# Patient Record
Sex: FEMALE | Race: BLACK OR AFRICAN AMERICAN
[De-identification: names, ages, dates, MRNs, and addresses within clinical notes are randomized per-mention and may not be internally consistent; named-entity substitution may affect disease eponyms.]

---

## 2019-01-08 NOTE — HP
CIWA Score





- Admission Criteria


OASAS Guidelines: Admission for Medically Managed Detox: 


Requires at least one of the followin. CIWA greater than 12


2. Seizures within the past 24 hours


3. Delirium tremens within the past 24 hours


4. Hallucinations within the past 24 hours


5. Acute intervention needed for co  occurring medical disorder


6. Acute intervention needed for co  occurring psychiatric disorder


7. Severe withdrawal that cannot be handled at a lower level of care (continued


    vomiting, continued diarrhea, abnormal vital signs) requiring intravenous


    medication and/or fluids


8. Pregnancy








Admission ROS [facility]





- [facility]


Chief Complaint: 





HERE SEEKING DETOX . DENIES WITHDRAWAL SXS'. LAST DRINK 1 DAY AGO. DENIES HX/O 

SEIZURES. OFFERED REHAB SERVICES. CLIENT ACCEPTS TO MOVE FORWARD WITH ADMISSION


Allergies/Adverse Reactions: 


 Allergies











Allergy/AdvReac Type Severity Reaction Status Date / Time


 


No Known Drug Allergies Allergy   Verified 19 19:08











History of Present Illness: 





28 Y.O FEMALE WITH HX/O ALCOHOLISM, CRACK, CANNABIS DEPENDENCE ADMITTED TO 

REHAB.HERE INITIALLY SEEKING DETOX . REPORTS SHE DRINKS 1 16 OZ CAN OF BEER AND 

5 OZ LIQUOR DAILY. DENIES WITHDRAWAL SXS'/ SEIZURES. REPORTS LAST DRINK 1 DAY 

AGO. OFFERED REHAB SERVICES. CLIENT ACCEPTS TO MOVE FORWARD WITH ADMISSION. 

THIS IS HERE FIRST ADMISSION HERE. STATES THIS IS HER FIRST INPATIENT TXMENT. 

REFERRED BY [facility] AFTER PRESENTING THERE FOR HELP TO QUIT HER 

SUBSTANCE ABUSE. DENIES ANY SIGNIFICANT CLEAN TIME. DENIES HX/O AND PRESENT SI/

HI, AVH. REPORTS SHE IS CURRENTLY HOMELESS, UNEMPLOYED-SSI, DENIES LEGALS.





PMHX- ASTHMA


PSYCH- BIPOLAR, SCHIZOPHRENIA, PSYCHOSIS


MEDS- RISPERDAL, SEROQUEL LAST TAKEN 2 WEEKS AGO.


Exam Limitations: No Limitations





- Ebola screening


Have you traveled outside of the country in the last 21 days: No


Have you had contact with anyone from an Ebola affected area: No


Have you been sick,other than usual withdrawal symptoms: No


Do you have a fever: No





- Review of Systems


Constitutional: No Symptoms Reported


EENT: reports: Dental Problems (MISSING TEETH)


Respiratory: reports: Shortness of Breath (INTERMITTENT WITH COUGH), Productive 

cough


Cardiac: reports: No Symptoms Reported


GI: reports: No Symptoms Reported


: reports: No Symptoms Reported


Musculoskeletal: reports: No Symptoms Reported


Integumentary: reports: No Symptoms Reported


Neuro: reports: No Symptoms reported


Endocrine: reports: No Symptoms Reported


Hematology: reports: No Symptoms Reported


Psychiatric: reports: Orientated x3


Other Systems: Reviewed and Negative





Patient History





- Patient Medical History


Hx Anemia: No


Hx Asthma: Yes (Pt is Albuterol IH prn)


Hx Chronic Obstructive Pulmonary Disease (COPD): No


Hx Cancer: No


Hx Cardiac Disorders: No


Hx Congestive Heart Failure: No


Hx Hypertension: No


Hx Hypercholesterolemia: No


Hx Pacemaker: No


HX Cerebrovascular Accident: No


Hx Seizures: No


Hx Dementia: No


Hx Diabetes: No


Hx Gastrointestinal Disorders: No


Hx Liver Disease: No


Hx Genitourinary Disorders: No


Hx Sexually Transmitted Disorders: No


Hx Renal Disease (ESRD): No


Hx Thyroid Disease: No


Hx Human Immunodeficiency Virus (HIV): No


Hx Hepatitis C: No


Hx Depression: No


Hx Suicide Attempt: No


Hx Bipolar Disorder: Yes


Hx Schizophrenia: Yes


Other Medical History: PSYCHOSIS





- Patient Surgical History


Past Surgical History: No


Hx Neurologic Surgery: No


Hx Cataract Extraction: No


Hx Cardiac Surgery: No


Hx Lung Surgery: No


Hx Breast Surgery: No


Hx Breast Biopsy: No


Hx Abdominal Surgery: No


Hx Appendectomy: No


Hx Cholecystectomy: No


Hx Genitourinary Surgery: No


Hx  Section: No


Hx Orthopedic Surgery: No


Anesthesia Reaction: No





- PPD History


Previous Implant?: Yes


Documented Results: Negative w/o proof


Implanted On Prior [facility] Admission?: No


PPD to be Administered?: Yes





- Reproductive History


Patient is a Female of Child Bearing Age (11 -55 yrs old): Yes


Last Menstrual Period: 19


LMP comment: REG


Patient Pregnant: No (NEG [facility]CG)





- Smoking Cessation


Smoking history: Current every day smoker


Have you smoked in the past 12 months: Yes


Aproximately how many cigarettes per day: 5


Cigars Per Day: 0


Hx Chewing Tobacco Use: No


Initiated information on smoking cessation: Yes


'Breaking Loose' booklet given: 19





- Substance & Tx. History


Hx Alcohol Use: Yes


Hx Substance Use: Yes


Substance Use Type: Alcohol, Cocaine, Marijuana


Hx Substance Use Treatment: No





- Substances Abused


  ** Alcohol


Route: Oral


Frequency: Daily


Amount used: 1-16 OZ BEER/ 5OZ-/LIQUOR


Age of first use: 17


Date of Last Use: 19





  ** Crack


Route: Inhalation


Frequency: Daily


Amount used: $100


Age of first use: 19


Date of Last Use: 19





  ** Marijuana/Hashish


Route: Smoking


Frequency: Daily


Amount used: 2 bags


Age of first use: 17


Date of Last Use: 19





Family Disease History





- Family Disease History


Family Disease History: Other: Grandparent (GRANDMOTHER BIPOLAR)





Admission Physical Exam [facility]





- Vital Signs


Vital Signs: 


 Vital Signs - 24 hr











  19





  18:46


 


Temperature 96.7 F L


 


Pulse Rate 84


 


Respiratory 18





Rate 


 


Blood Pressure 127/80














- Physical


General Appearance: Yes: No Apparent Distress, Disheveled, Other (UNKEPT)


HEENTM: Yes: EOMI, Normocephalic, Normal Voice, BRIANNE, Pharynx Normal


Respiratory: Yes: Chest Non-Tender, Lungs Clear, Normal Breath Sounds, No 

Respiratory Distress, No Accessory Muscle Use


Neck: Yes: No masses,lesions,Nodules, Supple


Breast: Yes: Breast Exam Deferred


Cardiology: Yes: Regular Rhythm, Regular Rate, S1, S2


Abdominal: Yes: Normal Bowel Sounds, Non Tender, Soft, Protuberent


Genitourinary: Yes: Other (NO C/O OFFERED)


Back: Yes: Normal Inspection


Musculoskeletal: Yes: full range of Motion, Gait Steady


Extremities: Yes: Normal Capillary Refill, Normal Inspection, Normal Range of 

Motion, Non-Tender


Neurological: Yes: Fully Oriented, Alert, Normal Mood/Affect


Integumentary: Yes: Dry, Warm


Lymphatic: Yes: Within Normal Limits





- Diagnostic


(1) Uncomplicated alcohol dependence


Current Visit: Yes   Status: Chronic   





(2) Cannabis dependence, uncomplicated


Current Visit: Yes   Status: Chronic   





(3) Cocaine abuse, uncomplicated


Current Visit: Yes   Status: Chronic   





(4) Nicotine dependence


Current Visit: Yes   Status: Chronic   


Qualifiers: 


   Nicotine product type: cigarettes   Substance use status: uncomplicated   

Qualified Code(s): F17.210 - Nicotine dependence, cigarettes, uncomplicated   





(5) Asthma


Current Visit: Yes   Status: Chronic   


Qualifiers: 


   Asthma severity: mild   Asthma persistence: intermittent   Asthma 

complication type: uncomplicated   Qualified Code(s): J45.20 - Mild 

intermittent asthma, uncomplicated   





(6) Homeless


Current Visit: Yes   Status: Chronic   





(7) Substance induced mood disorder


Current Visit: Yes   Status: Suspected   Comment: REPORTS HX/O BIPOLAR, 

SCHIZOPHRENIA, PSYCHOSIS   





(8) Non compliance w medication regimen


Current Visit: Yes   Status: Chronic   





(9) Obesity (BMI 30.0-34.9)


Current Visit: Yes   Status: Chronic   





Cleared for Admission [facility]





- Detox or Rehab


Detox Regimen/Protocol: Not Applicable


Claeared for Rehab Admission: Yes





[facility] Breath Alcohol Content


Breath Alcohol Content: 0





Urine Pregancy Test





- Result


Urine Pregnancy Test Results: Negative- NO Line Present





Urine Drug Screen





- Results


Drug Screen Negative: No


Urine Drug Screen Results: THC-Marijuana, DINAH-Cocaine





Inpatient Rehab Admission





- Initial Determination


Are CD services needed?: Yes


Free of communicable disease: Yes


Not in need of hospitalization: Yes





- Rehab Admission Criteria


Previous failed treatment: Yes


Poor recovery environment: Yes


Comorbidities: Yes


Lacks judgement: No


Patient is meeting Inpatient Rehab admission criteria:: Yes

## 2019-01-09 NOTE — EKG
Test Reason : 

Blood Pressure : ***/*** mmHG

Vent. Rate : 086 BPM     Atrial Rate : 086 BPM

   P-R Int : 146 ms          QRS Dur : 082 ms

    QT Int : 388 ms       P-R-T Axes : 059 051 030 degrees

   QTc Int : 464 ms

 

NORMAL SINUS RHYTHM

NORMAL ECG

NO PREVIOUS ECGS AVAILABLE

Confirmed by CHRIS HESTER, ENRIQUE ([id number]) on 1/9/2019 12:55:56 PM

 

Referred By:             Confirmed By:ENRIQUE PEREZ MD
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
## 2019-03-13 ENCOUNTER — HOSPITAL ENCOUNTER (INPATIENT)
Dept: HOSPITAL 74 - YASAS | Age: 29
LOS: 6 days | Discharge: LEFT BEFORE DISCHARGE | DRG: 770 | End: 2019-03-19
Attending: NEUROMUSCULOSKELETAL MEDICINE & OMM | Admitting: NEUROMUSCULOSKELETAL MEDICINE & OMM
Payer: COMMERCIAL

## 2019-03-13 VITALS — BODY MASS INDEX: 33.3 KG/M2

## 2019-03-13 DIAGNOSIS — F12.20: ICD-10-CM

## 2019-03-13 DIAGNOSIS — J45.20: ICD-10-CM

## 2019-03-13 DIAGNOSIS — F17.210: ICD-10-CM

## 2019-03-13 DIAGNOSIS — E66.9: ICD-10-CM

## 2019-03-13 DIAGNOSIS — F13.20: ICD-10-CM

## 2019-03-13 DIAGNOSIS — F14.20: ICD-10-CM

## 2019-03-13 DIAGNOSIS — R03.0: ICD-10-CM

## 2019-03-13 DIAGNOSIS — F10.20: Primary | ICD-10-CM

## 2019-03-13 RX ADMIN — Medication SCH: at 21:47
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
## 2019-03-14 LAB
ALBUMIN SERPL-MCNC: 4.1 G/DL (ref 3.4–5)
ALP SERPL-CCNC: 66 U/L (ref 45–117)
ALT SERPL-CCNC: 15 U/L (ref 13–61)
ANION GAP SERPL CALC-SCNC: 7 MMOL/L (ref 8–16)
AST SERPL-CCNC: 13 U/L (ref 15–37)
BILIRUB SERPL-MCNC: 0.6 MG/DL (ref 0.2–1)
BUN SERPL-MCNC: 8 MG/DL (ref 7–18)
CALCIUM SERPL-MCNC: 9.2 MG/DL (ref 8.5–10.1)
CHLORIDE SERPL-SCNC: 106 MMOL/L (ref 98–107)
CO2 SERPL-SCNC: 24 MMOL/L (ref 21–32)
CREAT SERPL-MCNC: 0.8 MG/DL (ref 0.55–1.3)
DEPRECATED RDW RBC AUTO: 14.6 % (ref 11.6–15.6)
GLUCOSE SERPL-MCNC: 73 MG/DL (ref 74–106)
HCT VFR BLD CALC: 35.5 % (ref 32.4–45.2)
HGB BLD-MCNC: 12.2 GM/DL (ref 10.7–15.3)
MCH RBC QN AUTO: 31.7 PG (ref 25.7–33.7)
MCHC RBC AUTO-ENTMCNC: 34.3 G/DL (ref 32–36)
MCV RBC: 92.4 FL (ref 80–96)
PLATELET # BLD AUTO: 311 K/MM3 (ref 134–434)
PMV BLD: 8.9 FL (ref 7.5–11.1)
POTASSIUM SERPLBLD-SCNC: 4 MMOL/L (ref 3.5–5.1)
PROT SERPL-MCNC: 7.8 G/DL (ref 6.4–8.2)
RBC # BLD AUTO: 3.84 M/MM3 (ref 3.6–5.2)
SODIUM SERPL-SCNC: 137 MMOL/L (ref 136–145)
WBC # BLD AUTO: 5.6 K/MM3 (ref 4–10)

## 2019-03-14 RX ADMIN — HYDROXYZINE PAMOATE PRN MG: 25 CAPSULE ORAL at 21:46

## 2019-03-14 RX ADMIN — Medication SCH MG: at 21:46

## 2019-03-14 RX ADMIN — Medication SCH TAB: at 10:38

## 2019-03-14 RX ADMIN — NICOTINE SCH: 14 PATCH, EXTENDED RELEASE TRANSDERMAL at 10:38

## 2019-03-15 RX ADMIN — NICOTINE SCH: 14 PATCH, EXTENDED RELEASE TRANSDERMAL at 11:15

## 2019-03-15 RX ADMIN — Medication SCH TAB: at 11:15

## 2019-03-15 RX ADMIN — Medication SCH MG: at 21:26

## 2019-03-16 PROCEDURE — HZ42ZZZ GROUP COUNSELING FOR SUBSTANCE ABUSE TREATMENT, COGNITIVE-BEHAVIORAL: ICD-10-PCS | Performed by: NEUROMUSCULOSKELETAL MEDICINE & OMM

## 2019-03-16 RX ADMIN — Medication SCH MG: at 21:41

## 2019-03-16 RX ADMIN — NICOTINE SCH: 14 PATCH, EXTENDED RELEASE TRANSDERMAL at 10:19

## 2019-03-16 RX ADMIN — HYDROXYZINE PAMOATE PRN MG: 25 CAPSULE ORAL at 21:41

## 2019-03-16 RX ADMIN — Medication SCH TAB: at 10:19

## 2019-03-17 RX ADMIN — Medication SCH: at 21:47

## 2019-03-17 RX ADMIN — NICOTINE SCH: 14 PATCH, EXTENDED RELEASE TRANSDERMAL at 10:22

## 2019-03-17 RX ADMIN — Medication SCH: at 10:22

## 2019-03-18 VITALS — SYSTOLIC BLOOD PRESSURE: 125 MMHG | DIASTOLIC BLOOD PRESSURE: 83 MMHG | HEART RATE: 67 BPM | TEMPERATURE: 98.4 F

## 2019-03-18 RX ADMIN — Medication SCH: at 10:19

## 2019-03-18 RX ADMIN — HYDROXYZINE PAMOATE PRN MG: 25 CAPSULE ORAL at 19:55

## 2019-03-18 RX ADMIN — NICOTINE SCH: 14 PATCH, EXTENDED RELEASE TRANSDERMAL at 10:19

## 2019-03-19 RX ADMIN — Medication SCH: at 00:27

## 2019-03-19 NOTE — PN
[facility] Progress Note


Note: 





NURSING STAFF ON UNIT INFORMED WRITER THAT PT WANTS TO LEAVE. WRITER SPOKE WITH 

PT'S COUNSELOR, LILA EUCEDA WHO STATED PT REPORTED TO HER SHE IS GOING 

BACK TO WORK BUT  WAS CURRENTLY TAKING A SHOWER SO COULD NOT BE SEEN AT FIRST 

VISIT TO THE UNIT. WHEN WRITER RETURNED  BACK TO THE UNIT TO SPEAK TO PT, NURSE 

LEAH REPORTED THAT PT HAD LEFT. PROVIDER  WAS UNABLE TO SEE THIS PATIENT 1:1.


 Home Medications











 Medication  Instructions  Recorded


 


NK [No Known Home Medication]  03/13/19











 


 Vital Signs (72 hours)











  03/17/19 03/17/19 03/17/19





  00:30 03:30 07:13


 


Temperature   


 


Pulse Rate   


 


Respiratory 16 16 16





Rate   


 


Blood Pressure   














  03/18/19 03/18/19 03/19/19





  03:30 06:59 00:30


 


Temperature  98.4 F 


 


Pulse Rate  67 


 


Respiratory 18 16 18





Rate   


 


Blood Pressure  125/83 














  03/19/19





  03:30


 


Temperature 


 


Pulse Rate 


 


Respiratory 18





Rate 


 


Blood Pressure 








 AS PER COUNSELOR ABOVE, PT IS REFERRED [facility] FOR MEDICAL MANAGEMENT 

WHEN NEEDED.
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
## 2019-03-19 NOTE — PN
[facility] Progress Note


Note: 





Patient not found on the unit. Told by nurse that patient left against medical 

advice

## 2019-06-24 ENCOUNTER — HOSPITAL ENCOUNTER (INPATIENT)
Dept: HOSPITAL 74 - YASAS | Age: 29
LOS: 2 days | Discharge: LEFT BEFORE DISCHARGE | DRG: 770 | End: 2019-06-26
Attending: NEUROMUSCULOSKELETAL MEDICINE & OMM | Admitting: NEUROMUSCULOSKELETAL MEDICINE & OMM
Payer: COMMERCIAL

## 2019-06-24 DIAGNOSIS — F17.210: ICD-10-CM

## 2019-06-24 DIAGNOSIS — Z87.42: ICD-10-CM

## 2019-06-24 DIAGNOSIS — Z91.5: ICD-10-CM

## 2019-06-24 DIAGNOSIS — F12.20: ICD-10-CM

## 2019-06-24 DIAGNOSIS — J45.909: ICD-10-CM

## 2019-06-24 DIAGNOSIS — F14.20: Primary | ICD-10-CM

## 2019-06-24 DIAGNOSIS — F25.9: ICD-10-CM

## 2019-06-24 PROCEDURE — HZ42ZZZ GROUP COUNSELING FOR SUBSTANCE ABUSE TREATMENT, COGNITIVE-BEHAVIORAL: ICD-10-PCS | Performed by: NEUROMUSCULOSKELETAL MEDICINE & OMM

## 2019-06-24 RX ADMIN — Medication SCH: at 21:59

## 2019-06-24 NOTE — HP
CIWA Score





- Admission Criteria


[facility] Guidelines: Admission for Medically Managed Detox: 


Requires at least one of the followin. CIWA greater than 12


2. Seizures within the past 24 hours


3. Delirium tremens within the past 24 hours


4. Hallucinations within the past 24 hours


5. Acute intervention needed for co  occurring medical disorder


6. Acute intervention needed for co  occurring psychiatric disorder


7. Severe withdrawal that cannot be handled at a lower level of care (continued


    vomiting, continued diarrhea, abnormal vital signs) requiring intravenous


    medication and/or fluids


8. Pregnancy








Admission ROS S





- HPI


Chief Complaint: 





here for rehab from cocaine, K2, heroin





29 yo with hx of crack cocaine, k2, marijuana, dependence is here seeking 

rehabilitation. Pt was last here on 3/2019 and stayed for a week. Pt states she 

was going through craving for crack cocaine and so left but says she is very 

committed to complete rehab this time. Says she is tired of using.





Denies hx of seizures or blackouts. Denies suicidal / homicidal ideation. 

Reports hx of suicide attempt five years ago by lacerating wrist. 


PMHX; Asthma, bipolar, schizophrenia and psychosis. 





K2- 2 sticks


cocaine crack: 20 caps/day


marijuana- 2





DUR- no controlled substances


Utox- estrada

















Allergies/Adverse Reactions: 


 Allergies











Allergy/AdvReac Type Severity Reaction Status Date / Time


 


No Known Drug Allergies Allergy   Verified 19 16:23














- Ebola screening


Have you traveled outside of the country in the last 21 days: No


Have you had contact with anyone from an Ebola affected area: No





Patient History





- Patient Medical History


Hx Anemia: No


Hx Asthma: Yes


Hx Chronic Obstructive Pulmonary Disease (COPD): No


Hx Cancer: No


Hx Cardiac Disorders: No


Hx Congestive Heart Failure: No


Hx Hypertension: No


Hx Hypercholesterolemia: No


Hx Pacemaker: No


HX Cerebrovascular Accident: No


Hx Seizures: No


Hx Dementia: No


Hx Diabetes: No


Hx Gastrointestinal Disorders: No


Hx Liver Disease: No


Hx Genitourinary Disorders: No


Hx Sexually Transmitted Disorders: Yes (chlamydia )


Hx Renal Disease (ESRD): No


Hx Thyroid Disease: No


Hx Human Immunodeficiency Virus (HIV): No


Hx Hepatitis C: No


Hx Depression: No


Hx Suicide Attempt: Yes (five years ago)


Hx Bipolar Disorder: Yes


Hx Schizophrenia: Yes





- Patient Surgical History


Past Surgical History: No


Hx Neurologic Surgery: No


Hx Cataract Extraction: No


Hx Cardiac Surgery: No


Hx Lung Surgery: No


Hx Breast Surgery: No


Hx Breast Biopsy: No


Hx Abdominal Surgery: No


Hx Appendectomy: No


Hx Cholecystectomy: No


Hx Genitourinary Surgery: No


Hx  Section: No


Hx Orthopedic Surgery: No


Anesthesia Reaction: No





- PPD History


Date: 01/10/19





- Reproductive History


Last Menstrual Period: 19


Patient Pregnant: No





- Smoking Cessation


Smoking history: Current every day smoker


Have you smoked in the past 12 months: Yes


Aproximately how many cigarettes per day: 5


Cigars Per Day: 0


Hx Chewing Tobacco Use: No


Initiated information on smoking cessation: Yes


'Breaking Loose' booklet given: 19





- Substances abused


  ** Crack


Substance route: Inhalation


Frequency: Daily


Amount used: 20 capsules/day


Age of first use: 


Date of last use: 18





  ** Cocaine


Substance route: Inhalation


Frequency: Daily


Amount used: 20 capsules/day


Age of first use: 26


Date of last use: 18





  ** Heroin


Substance route: Inhalation


Frequency: Daily


Amount used: 2 packs/day


Age of first use: 


Date of last use: 18





Family Disease History





- Family Disease History


Family Disease History: Other: Grandparent (GRANDMOTHER BIPOLAR)





Admission Physical Exam [facility]





- Vital Signs


Vital Signs: 


 Vital Signs - 24 hr











  19





  12:38


 


Temperature 97.5 F L


 


Pulse Rate 78


 


Respiratory 20





Rate 


 


Blood Pressure 141/99














- Physical


General Appearance: Yes: Within Normal Limits


HEENTM: Yes: Within Normal Limits, EOMI, Hearing grossly Normal, Normal Voice, 

BRIANNE


Respiratory: Yes: Within Normal Limits, Lungs Clear


Neck: Yes: Within Normal Limits


Cardiology: Yes: Within Normal Limits, Regular Rhythm, Regular Rate


Abdominal: Yes: Within Normal Limits, Normal Bowel Sounds, Protuberent


Back: Yes: Within Normal Limits, Normal Inspection


Musculoskeletal: Yes: Within Normal Limits


Extremities: Yes: Within Normal Limits


Neurological: Yes: Within Normal Limits, CNs II-XII NML intact, Fully Oriented, 

Alert


Integumentary: Yes: Within Normal Limits





- Diagnostic


(1) Cannabis dependence


Current Visit: No   Status: Acute   





(2) Cocaine dependence


Current Visit: No   Status: Acute   





(3) Asthma


Current Visit: No   Status: Chronic   


Qualifiers: 


   Asthma severity: mild   Asthma persistence: intermittent   Asthma 

complication type: uncomplicated   Qualified Code(s): J45.20 - Mild 

intermittent asthma, uncomplicated   





(4) Nicotine dependence


Current Visit: No   Status: Chronic   


Qualifiers: 


   Nicotine product type: cigarettes   Substance use status: uncomplicated   

Qualified Code(s): F17.210 - Nicotine dependence, cigarettes, uncomplicated   





Breathalyzer





- Breathalyzer


Breathalyzer: 0





Urine Drug Screen





- Test Device


Lot number: [id number]


Expiration date: 21





- Control


Is test valid?: Yes





- Results


Drug screen NEGATIVE: No


Urine drug screen results: ESTRADA-Cocaine





Inpatient Rehab Admission





- Rehab Decision to Admit


Inpatient rehab admission?: Yes





- Initial Determination


Are CD services needed?: Yes


Free of communicable disease: Yes


Not in need of hospitalization: Yes





- Rehab Admission Criteria


Previous failed treatment: Yes


Poor recovery environment: Yes


Comorbidities: Yes


Lacks judgement: Yes


Patient is meeting Inpatient Rehab admission criteria:: Yes (cocaine, K2 and MJ 

use)

## 2019-06-25 LAB
ALBUMIN SERPL-MCNC: 3.3 G/DL (ref 3.4–5)
ALP SERPL-CCNC: 59 U/L (ref 45–117)
ALT SERPL-CCNC: 13 U/L (ref 13–61)
ANION GAP SERPL CALC-SCNC: 6 MMOL/L (ref 8–16)
AST SERPL-CCNC: 10 U/L (ref 15–37)
BILIRUB SERPL-MCNC: 0.7 MG/DL (ref 0.2–1)
BUN SERPL-MCNC: 9 MG/DL (ref 7–18)
CALCIUM SERPL-MCNC: 8.5 MG/DL (ref 8.5–10.1)
CHLORIDE SERPL-SCNC: 107 MMOL/L (ref 98–107)
CO2 SERPL-SCNC: 27 MMOL/L (ref 21–32)
CREAT SERPL-MCNC: 0.9 MG/DL (ref 0.55–1.3)
DEPRECATED RDW RBC AUTO: 15.2 % (ref 11.6–15.6)
GLUCOSE SERPL-MCNC: 72 MG/DL (ref 74–106)
HCT VFR BLD CALC: 36.7 % (ref 32.4–45.2)
HGB BLD-MCNC: 12.4 GM/DL (ref 10.7–15.3)
MCH RBC QN AUTO: 31.1 PG (ref 25.7–33.7)
MCHC RBC AUTO-ENTMCNC: 33.7 G/DL (ref 32–36)
MCV RBC: 92.5 FL (ref 80–96)
PLATELET # BLD AUTO: 279 K/MM3 (ref 134–434)
PMV BLD: 8.5 FL (ref 7.5–11.1)
POTASSIUM SERPLBLD-SCNC: 4.1 MMOL/L (ref 3.5–5.1)
PROT SERPL-MCNC: 6.9 G/DL (ref 6.4–8.2)
RBC # BLD AUTO: 3.97 M/MM3 (ref 3.6–5.2)
SODIUM SERPL-SCNC: 140 MMOL/L (ref 136–145)
WBC # BLD AUTO: 5.2 K/MM3 (ref 4–10)

## 2019-06-25 RX ADMIN — RISPERIDONE SCH MG: 1 TABLET, FILM COATED ORAL at 11:00

## 2019-06-25 RX ADMIN — BENZTROPINE MESYLATE SCH MG: 1 TABLET ORAL at 11:00

## 2019-06-25 RX ADMIN — BENZTROPINE MESYLATE SCH MG: 1 TABLET ORAL at 21:54

## 2019-06-25 RX ADMIN — RISPERIDONE SCH MG: 1 TABLET, FILM COATED ORAL at 21:55

## 2019-06-25 RX ADMIN — Medication SCH MG: at 21:54

## 2019-06-25 RX ADMIN — Medication SCH TAB: at 09:28
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
## 2019-06-26 VITALS — TEMPERATURE: 97.6 F | HEART RATE: 89 BPM | SYSTOLIC BLOOD PRESSURE: 114 MMHG | DIASTOLIC BLOOD PRESSURE: 83 MMHG

## 2019-06-26 RX ADMIN — BENZTROPINE MESYLATE SCH MG: 1 TABLET ORAL at 09:52

## 2019-06-26 RX ADMIN — RISPERIDONE SCH MG: 1 TABLET, FILM COATED ORAL at 10:37

## 2019-06-26 RX ADMIN — Medication SCH TAB: at 09:52

## 2019-06-26 NOTE — PN
[facility] Progress Note


Note: 


Patient is alert and oriented.





 Vital Signs - 24 hr











  06/26/19 06/26/19 06/26/19





  00:30 03:30 07:03


 


Temperature   97.6 F


 


Pulse Rate   89


 


Respiratory 16 16 18





Rate   


 


Blood Pressure   114/83








 Laboratory Last Values











WBC  5.2 K/mm3 (4.0-10.0)   06/25/19  08:20    


 


RBC  3.97 M/mm3 (3.60-5.2)   06/25/19  08:20    


 


Hgb  12.4 GM/dL (10.7-15.3)   06/25/19  08:20    


 


Hct  36.7 % (32.4-45.2)   06/25/19  08:20    


 


MCV  92.5 fl (80-96)   06/25/19  08:20    


 


MCH  31.1 pg (25.7-33.7)   06/25/19  08:20    


 


MCHC  33.7 g/dl (32.0-36.0)   06/25/19  08:20    


 


RDW  15.2 % (11.6-15.6)   06/25/19  08:20    


 


Plt Count  279 K/MM3 (134-434)   06/25/19  08:20    


 


MPV  8.5 fl (7.5-11.1)   06/25/19  08:20    


 


Sodium  140 mmol/L (136-145)   06/25/19  08:20    


 


Potassium  4.1 mmol/L (3.5-5.1)   06/25/19  08:20    


 


Chloride  107 mmol/L ()   06/25/19  08:20    


 


Carbon Dioxide  27 mmol/L (21-32)   06/25/19  08:20    


 


Anion Gap  6 MMOL/L (8-16)  L  06/25/19  08:20    


 


BUN  9.0 mg/dL (7-18)   06/25/19  08:20    


 


Creatinine  0.9 mg/dL (0.55-1.3)   06/25/19  08:20    


 


Est GFR (CKD-EPI)AfAm  100.14   06/25/19  08:20    


 


Est GFR (CKD-EPI)NonAf  86.41   06/25/19  08:20    


 


Random Glucose  72 mg/dL ()  L  06/25/19  08:20    


 


Calcium  8.5 mg/dL (8.5-10.1)   06/25/19  08:20    


 


Total Bilirubin  0.7 mg/dL (0.2-1)   06/25/19  08:20    


 


AST  10 U/L (15-37)  L  06/25/19  08:20    


 


ALT  13 U/L (13-61)   06/25/19  08:20    


 


Alkaline Phosphatase  59 U/L ()   06/25/19  08:20    


 


Total Protein  6.9 g/dl (6.4-8.2)   06/25/19  08:20    


 


Albumin  3.3 g/dl (3.4-5.0)  L  06/25/19  08:20    


 


RPR Titer  Nonreactive  (NONREACTIVE)   06/25/19  08:20    








Labs reviewed. 


Patient states feeling very anxious and wants to leave. States if stays will be 

unable to control self.


Patient states met with counselor and plans for community support discussed.





Patient admitted to rehab w/ a hx of crack cocaine, marijuana, and K2 use 

disorder. Health risks of restarting drugs discussed.


Patient w/ a hx of schizoaffective disorder. Denies thoughts of harming self or 

others. States has Risperdal and Seroquel at home. 





Patient left AMA despite encouragement to stay.

## 2020-01-02 ENCOUNTER — HOSPITAL ENCOUNTER (INPATIENT)
Dept: HOSPITAL 74 - YASAS | Age: 30
LOS: 2 days | Discharge: LEFT BEFORE DISCHARGE | DRG: 770 | End: 2020-01-04
Attending: NEUROMUSCULOSKELETAL MEDICINE & OMM | Admitting: NEUROMUSCULOSKELETAL MEDICINE & OMM
Payer: COMMERCIAL

## 2020-01-02 VITALS — BODY MASS INDEX: 30.9 KG/M2

## 2020-01-02 DIAGNOSIS — Z59.0: ICD-10-CM

## 2020-01-02 DIAGNOSIS — Z91.14: ICD-10-CM

## 2020-01-02 DIAGNOSIS — F17.210: ICD-10-CM

## 2020-01-02 DIAGNOSIS — F19.24: ICD-10-CM

## 2020-01-02 DIAGNOSIS — F14.20: Primary | ICD-10-CM

## 2020-01-02 DIAGNOSIS — F25.9: ICD-10-CM

## 2020-01-02 DIAGNOSIS — Z56.0: ICD-10-CM

## 2020-01-02 DIAGNOSIS — Z91.5: ICD-10-CM

## 2020-01-02 DIAGNOSIS — F11.10: ICD-10-CM

## 2020-01-02 DIAGNOSIS — F31.9: ICD-10-CM

## 2020-01-02 LAB
ALBUMIN SERPL-MCNC: 3.5 G/DL (ref 3.4–5)
ALP SERPL-CCNC: 58 U/L (ref 45–117)
ALT SERPL-CCNC: 15 U/L (ref 13–61)
ANION GAP SERPL CALC-SCNC: 5 MMOL/L (ref 8–16)
AST SERPL-CCNC: 15 U/L (ref 15–37)
BILIRUB SERPL-MCNC: 0.7 MG/DL (ref 0.2–1)
BUN SERPL-MCNC: 13.4 MG/DL (ref 7–18)
CALCIUM SERPL-MCNC: 9.1 MG/DL (ref 8.5–10.1)
CHLORIDE SERPL-SCNC: 108 MMOL/L (ref 98–107)
CO2 SERPL-SCNC: 27 MMOL/L (ref 21–32)
CREAT SERPL-MCNC: 1 MG/DL (ref 0.55–1.3)
DEPRECATED RDW RBC AUTO: 15.3 % (ref 11.6–15.6)
GLUCOSE SERPL-MCNC: 86 MG/DL (ref 74–106)
HCT VFR BLD CALC: 38 % (ref 32.4–45.2)
HGB BLD-MCNC: 12.4 GM/DL (ref 10.7–15.3)
MCH RBC QN AUTO: 30.6 PG (ref 25.7–33.7)
MCHC RBC AUTO-ENTMCNC: 32.7 G/DL (ref 32–36)
MCV RBC: 93.7 FL (ref 80–96)
PLATELET # BLD AUTO: 336 K/MM3 (ref 134–434)
PMV BLD: 8.8 FL (ref 7.5–11.1)
POTASSIUM SERPLBLD-SCNC: 4.1 MMOL/L (ref 3.5–5.1)
PROT SERPL-MCNC: 7.1 G/DL (ref 6.4–8.2)
RBC # BLD AUTO: 4.05 M/MM3 (ref 3.6–5.2)
SODIUM SERPL-SCNC: 140 MMOL/L (ref 136–145)
WBC # BLD AUTO: 5.1 K/MM3 (ref 4–10)

## 2020-01-02 PROCEDURE — HZ42ZZZ GROUP COUNSELING FOR SUBSTANCE ABUSE TREATMENT, COGNITIVE-BEHAVIORAL: ICD-10-PCS | Performed by: ALLERGY & IMMUNOLOGY

## 2020-01-02 RX ADMIN — RISPERIDONE SCH MG: 1 TABLET, FILM COATED ORAL at 21:35

## 2020-01-02 RX ADMIN — Medication SCH MG: at 21:35

## 2020-01-02 SDOH — ECONOMIC STABILITY - INCOME SECURITY: UNEMPLOYMENT, UNSPECIFIED: Z56.0

## 2020-01-02 SDOH — ECONOMIC STABILITY - HOUSING INSECURITY: HOMELESSNESS: Z59.0
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
## 2020-01-02 NOTE — PN
[facility] Progress Note


Note: 





Pt is a 30 y/o female with a hx of MORGAN admitted to rehab today. This writer saw 

pt this afternoon  in bed but pt not ready to engage in communication stating 

she just wants to rest. 


 


 Vital Signs - 24 hr











  01/02/20 01/02/20





  11:53 13:27


 


Temperature 98.2 F 97.9 F


 


Pulse Rate 80 84


 


Respiratory 18 18





Rate  


 


Blood Pressure 110/69 110/80








Labs pending





Alert o x 3


nad


resting in bed but verbal.








A/P


New rehab pt





Maintain safety


d/w pt will follow up with her  in the morning. pt agreed.

## 2020-01-02 NOTE — HP
CIWA Score


Nausea/Vomitin-No Nausea/No Vomiting


Muscle Tremors: None


Anxiety: 1-Mildly Anxious


Agitation: 1-Slight > Activity


Paroxysmal Sweats: No Perspiration


Orientation: 0-Oriented


Tacttile Disturbances: 0-None


Auditory Disturbances: 0-None


Visual Disturbances: 0-None


Headache: 0-None Present


CIWA-Ar Total Score: 2





- Admission Criteria


[facility] Guidelines: Admission for Medically Managed Detox: 


Requires at least one of the followin. CIWA greater than 12


2. Seizures within the past 24 hours


3. Delirium tremens within the past 24 hours


4. Hallucinations within the past 24 hours


5. Acute intervention needed for co  occurring medical disorder


6. Acute intervention needed for co  occurring psychiatric disorder


7. Severe withdrawal that cannot be handled at a lower level of care (continued


    vomiting, continued diarrhea, abnormal vital signs) requiring intravenous


    medication and/or fluids


8. Pregnancy








Admitting History and Physical





- Admission


Chief Complaint: i need to come in for rehab from cocaine,heroin abused


History of Present Illness: 





this 29 years old female with cocaine dependence,heroin abused,need rehab,

prevous admissions to this facility last treatment in10/19 detox,did not recall 

facility,not completed


nicotine dependence


denied seizure


denied syncope


for rehab


non compliance








History Source: Patient


Limitations to Obtaining History: No Limitations





- Past Medical History


...LMP: 19


...Pregnant: No


Psych: Yes: Bipolar, Schizophrenia





- Smoking History


Smoking history: Current every day smoker


Have you smoked in the past 12 months: Yes


Aproximately how many cigarettes per day: 5





- Alcohol/Substance Use


Hx Alcohol Use: Yes


History of Substance Use: reports: Cocaine, Heroin





- Social History


Usual Living Arrangement: Yes: Other (homeless)


ADL: Support Services


Occupation: unemployed


History of Recent Travel: No





Admission ROS [facility]





- [facility]


Chief Complaint: 





i need help to stop using cocaine,heroin abused


Allergies/Adverse Reactions: 


 Allergies











Allergy/AdvReac Type Severity Reaction Status Date / Time


 


No Known Drug Allergies Allergy   Verified 20 11:52











History of Present Illness: 





this 29 years old female with cocaine dependence,heroin abused,seeking help,


multiple admissions   to this facility,non compliance issue


nicotine dependence


denied seizure


denied black out


last treatment 10/19 not completed,not recall facility


schizophrenia


bipolar disorder


homeless


unemployed











Exam Limitations: No Limitations





- Ebola screening


Have you traveled outside of the country in the last 21 days: No


Have you had contact with anyone from an Ebola affected area: No


Do you have a fever: No





- Review of Systems


Constitutional: No Symptoms Reported


EENT: reports: No Symptoms Reported


Respiratory: reports: No Symptoms reported


Cardiac: reports: No Symptoms Reported


GI: reports: No Symptoms Reported


: reports: No Symptoms Reported


Musculoskeletal: reports: No Symptoms Reported


Integumentary: reports: No Symptoms Reported


Neuro: reports: No Symptoms reported


Endocrine: reports: No Symptoms Reported


Hematology: reports: No Symptoms Reported


Psychiatric: reports: Judgement Intact


Other Systems: Reviewed and Negative





Patient History





- Patient Medical History


Hx Anemia: No


Hx Asthma: No


Hx Chronic Obstructive Pulmonary Disease (COPD): No


Hx Cancer: No


Hx Cardiac Disorders: No


Hx Congestive Heart Failure: No


Hx Hypertension: No


Hx Hypercholesterolemia: No


Hx Pacemaker: No


HX Cerebrovascular Accident: No


Hx Seizures: No


Hx Dementia: No


Hx Diabetes: No


Hx Gastrointestinal Disorders: No


Hx Liver Disease: No


Hx Genitourinary Disorders: No


Hx Sexually Transmitted Disorders: No


Hx Renal Disease (ESRD): No


Hx Thyroid Disease: No


Hx Human Immunodeficiency Virus (HIV): No (last)


Hx Hepatitis C: No


Hx Depression: Yes


Hx Suicide Attempt: Yes (Pt tried to cut wrist in )


Hx Bipolar Disorder: Yes


Hx Schizophrenia: No


Other Medical History: no suicidal,no homicidal





- Patient Surgical History


Past Surgical History: No


Hx Neurologic Surgery: No


Hx Cataract Extraction: No


Hx Cardiac Surgery: No


Hx Lung Surgery: No


Hx Breast Surgery: No


Hx Breast Biopsy: No


Hx Abdominal Surgery: No


Hx Appendectomy: No


Hx Cholecystectomy: No


Hx Genitourinary Surgery: No


Hx  Section: No


Hx Orthopedic Surgery: No


Anesthesia Reaction: No





- PPD History


Previous Implant?: Yes


Documented Results: Negative w/proof


Date: 01/10/19


Results: 0 MM


PPD to be Administered?: Yes





- Reproductive History


Patient is a Female of Child Bearing Age (11 -55 yrs old): Yes


Last Menstrual Period: 19


Patient Pregnant: No





- Smoking Cessation


Smoking history: Current every day smoker


Have you smoked in the past 12 months: Yes


Aproximately how many cigarettes per day: 5


Cigars Per Day: 0


Hx Chewing Tobacco Use: No


Initiated information on smoking cessation: Yes


'Breaking Loose' booklet given: 20





- Substance & Tx. History


Hx Alcohol Use: Yes


Hx Substance Use: Yes


Substance Use Type: Alcohol, Cocaine, Heroin


[facility]





- Substances abused


  ** Crack


Substance route: Smoking


Frequency: Daily


Amount used: 10 bags


Age of first use: 26


Date of last use: 18





  ** Cocaine


Substance route: Inhalation


Frequency: Daily


Amount used: $10


Age of first use: 26


Date of last use: 20





  ** Heroin


Substance route: Inhalation


Frequency: 1-2 times per week


Amount used: 10 bags


Age of first use: 26


Date of last use: 18





Admission Physical Exam [facility]





- Vital Signs


Vital Signs: 


 Vital Signs - 24 hr











  20





  11:53


 


Temperature 98.2 F


 


Pulse Rate 80


 


Respiratory 18





Rate 


 


Blood Pressure 110/69














- Physical


General Appearance: Yes: Within Normal Limits


HEENTM: Yes: Within Normal Limits, BRIANNE, Pharynx Normal


Respiratory: Yes: Within Normal Limits, Lungs Clear, Normal Breath Sounds


Neck: Yes: Within Normal Limits, Supple, Trachea in good position


Breast: Yes: Breast Exam Deferred


Cardiology: Yes: Within Normal Limits, Regular Rhythm, Regular Rate, S1, S2


Abdominal: Yes: Within Normal Limits, Normal Bowel Sounds, Soft


Genitourinary: Yes: Within Normal Limits


Back: Yes: Within Normal Limits


Musculoskeletal: Yes: Within Normal Limits


Extremities: Yes: Within Normal Limits


Integumentary: Yes: Within Normal Limits





- Diagnostic


(1) Cocaine dependence


Current Visit: No   Status: Acute   





(2) Heroin abuse


Current Visit: Yes   Status: Acute   





(3) Homeless


Current Visit: No   Status: Chronic   





(4) Nicotine dependence


Current Visit: No   Status: Chronic   


Qualifiers: 


   Nicotine product type: cigarettes   Substance use status: uncomplicated   

Qualified Code(s): F17.210 - Nicotine dependence, cigarettes, uncomplicated   





(5) Schizophrenia


Current Visit: Yes   Status: Acute   





(6) Bipolar disorder


Current Visit: Yes   Status: Acute   





Cleared for Admission [facility]





- Detox or Rehab


Claeared for Rehab Admission: Yes





Breathalyzer





- Breathalyzer


Breathalyzer: 0





Urine Drug Screen





- Test Device


Lot number: [id number]


Expiration date: 21





- Control


Is test valid?: Yes





- Results


Drug screen NEGATIVE: No


Urine drug screen results: DINAH-Cocaine





Inpatient Rehab Admission





- Rehab Decision to Admit


Inpatient rehab admission?: Yes





- Initial Determination


Are CD services needed?: Yes


Free of communicable disease: Yes


Not in need of hospitalization: Yes





- Rehab Admission Criteria


Previous failed treatment: Yes


Poor recovery environment: Yes


Comorbidities: Yes


Lacks judgement: No


Patient is meeting Inpatient Rehab admission criteria:: Yes

## 2020-01-03 RX ADMIN — RISPERIDONE SCH MG: 1 TABLET, FILM COATED ORAL at 21:58

## 2020-01-03 RX ADMIN — Medication SCH MG: at 21:58

## 2020-01-03 RX ADMIN — GUAIFENESIN PRN ML: 100 SOLUTION ORAL at 21:58

## 2020-01-03 RX ADMIN — BENZTROPINE MESYLATE SCH MG: 1 TABLET ORAL at 09:55

## 2020-01-03 RX ADMIN — Medication SCH TAB: at 09:20

## 2020-01-03 RX ADMIN — RISPERIDONE SCH MG: 1 TABLET, FILM COATED ORAL at 09:20

## 2020-01-03 RX ADMIN — BENZTROPINE MESYLATE SCH MG: 1 TABLET ORAL at 21:58

## 2020-01-03 NOTE — PN
[facility] Progress Note


Note: 





Pt was again seen this morning but much awake and oob ambulating with steady 

gait. Reports feeling rested and offered no complaints at this time. Pt reports 

she has no primary care provider but goes to the [facility]  ER when 

needed. Pt was encouraged to follow up with primary care at the [facility] after discharge and enroll as an established patient.


 Vital Signs - 24 hr











  01/02/20 01/02/20 01/03/20





  11:53 13:27 00:30


 


Temperature 98.2 F 97.9 F 


 


Pulse Rate 80 84 


 


Respiratory 18 18 18





Rate   


 


Blood Pressure 110/69 110/80 














  01/03/20 01/03/20 01/03/20





  03:30 06:53 09:06


 


Temperature   98.2 F


 


Pulse Rate   116 H


 


Respiratory 18 18 17





Rate   


 


Blood Pressure   115/69











 Laboratory Tests











  01/02/20 01/02/20 01/02/20





  13:07 13:07 13:07


 


WBC  5.1  


 


RBC  4.05  


 


Hgb  12.4  


 


Hct  38.0  


 


MCV  93.7  


 


MCH  30.6  


 


MCHC  32.7  


 


RDW  15.3  


 


Plt Count  336  D  


 


MPV  8.8  


 


Sodium   140 


 


Potassium   4.1 


 


Chloride   108 H 


 


Carbon Dioxide   27 


 


Anion Gap   5 L 


 


BUN   13.4 


 


Creatinine   1.0 


 


Est GFR (CKD-EPI)AfAm   87.55 


 


Est GFR (CKD-EPI)NonAf   75.54 


 


Random Glucose   86 


 


Calcium   9.1 


 


Total Bilirubin   0.7 


 


AST   15 


 


ALT   15 


 


Alkaline Phosphatase   58 


 


Total Protein   7.1 


 


Albumin   3.5 


 


RPR Titer    Nonreactive








Alert o x 3, communicating needs appropriately; denies s/h/i


nad


ambulating freely





A/P


New pt to [facility] yesterday





Maintain safety


Continue rehab


increase po fluids

## 2020-01-04 VITALS — TEMPERATURE: 99.4 F

## 2020-01-04 VITALS — DIASTOLIC BLOOD PRESSURE: 58 MMHG | SYSTOLIC BLOOD PRESSURE: 88 MMHG | HEART RATE: 106 BPM

## 2020-01-04 RX ADMIN — GUAIFENESIN PRN ML: 100 SOLUTION ORAL at 10:59

## 2020-01-04 RX ADMIN — Medication SCH TAB: at 10:56

## 2020-01-04 RX ADMIN — BENZTROPINE MESYLATE SCH MG: 1 TABLET ORAL at 10:56

## 2020-01-04 RX ADMIN — RISPERIDONE SCH MG: 1 TABLET, FILM COATED ORAL at 10:56

## 2020-01-04 NOTE — PN
[facility] Progress Note


Note: 





informed by nurse that patient did not want to complete treatment ,


all attempts to convince patient to stay with no avail


sen by counselor


seen by nursing supervisor


she is alert,oriented x 3,,stated she feel much better,


would like to leave,has her own apartment,


no suicidal,no homicidal


she will follow up with her own psychiatrist at [place]


left the unit in good and stable condition


[employer] provide for transportation


stated she hs her medications at home

## 2020-01-04 NOTE — DS
[facility] Discharge Summary





- [facility] Discharge Summary


Admission Date: 01/02/20


Discharge Date: 01/04/20





- History


Present History: Cocaine dependence, Opioid dependence





- Discharge Physical Exam


Vital Signs: 


 Vital Signs











Temperature  99.4 F   01/04/20 20:17


 


Pulse Rate  106 H  01/04/20 20:15


 


Respiratory Rate  20 01/04/20 20:15


 


Blood Pressure  88/58 L  01/04/20 20:15


 


O2 Sat by Pulse Oximetry (%)      











Pertinent Admission Physical Exam Findings: 





 Vital Signs











Temperature  99.4 F   01/04/20 20:17


 


Pulse Rate  106 H  01/04/20 20:15


 


Respiratory Rate  20 01/04/20 20:15


 


Blood Pressure  88/58 L  01/04/20 20:15


 


O2 Sat by Pulse Oximetry (%)      














- Treatment


Discharge Condition: Discharge condition good


Hospital Course: 





patient was seen by psychiatrist placed on risperdal 1 mgs po bid and cogentin 

0.5 mg po bid,feeling much better,





- Medication


Discharge Medications: 


Ambulatory Orders





NK [No Known Home Medication]  01/02/20 











- Discharge Instructions


Diet, activity, other medical instructions: 





Diet:





Activity: 





Other medical instructions:








- Diagnosis


(1) Cocaine dependence


Current Visit: Yes   Status: Acute   





(2) Heroin abuse


Current Visit: Yes   Status: Acute   





(3) Nicotine dependence


Current Visit: No   Status: Chronic   


Qualifiers: 


   Nicotine product type: cigarettes   Substance use status: uncomplicated   

Qualified Code(s): F17.210 - Nicotine dependence, cigarettes, uncomplicated   





(4) Schizophrenia


Current Visit: Yes   Status: Acute   





(5) Bipolar disorder


Current Visit: Yes   Status: Acute   





(6) Schizoaffective disorder


Current Visit: Yes   Status: Acute   





- AMA


Did Patient Leave Against Medical Advice: Yes


Additional Comments: 





patient left [facility],left unit in stable condition,no suicidal,no homicidal ideation

,has medications at home,will see her own psychiatrist at [place] for follow up,


to continue medications as prescribed

## 2021-03-28 ENCOUNTER — HOSPITAL ENCOUNTER (INPATIENT)
Dept: HOSPITAL 74 - YASAS | Age: 31
LOS: 2 days | Discharge: LEFT BEFORE DISCHARGE | DRG: 770 | End: 2021-03-30
Attending: ALLERGY & IMMUNOLOGY | Admitting: ALLERGY & IMMUNOLOGY
Payer: COMMERCIAL

## 2021-03-28 VITALS — BODY MASS INDEX: 28.1 KG/M2

## 2021-03-28 DIAGNOSIS — F14.20: ICD-10-CM

## 2021-03-28 DIAGNOSIS — Z62.810: ICD-10-CM

## 2021-03-28 DIAGNOSIS — Z86.19: ICD-10-CM

## 2021-03-28 DIAGNOSIS — F17.210: ICD-10-CM

## 2021-03-28 DIAGNOSIS — F10.230: Primary | ICD-10-CM

## 2021-03-28 DIAGNOSIS — J45.20: ICD-10-CM

## 2021-03-28 DIAGNOSIS — F25.9: ICD-10-CM

## 2021-03-28 PROCEDURE — U0003 INFECTIOUS AGENT DETECTION BY NUCLEIC ACID (DNA OR RNA); SEVERE ACUTE RESPIRATORY SYNDROME CORONAVIRUS 2 (SARS-COV-2) (CORONAVIRUS DISEASE [COVID-19]), AMPLIFIED PROBE TECHNIQUE, MAKING USE OF HIGH THROUGHPUT TECHNOLOGIES AS DESCRIBED BY CMS-2020-01-R: HCPCS

## 2021-03-28 PROCEDURE — U0005 INFEC AGEN DETEC AMPLI PROBE: HCPCS

## 2021-03-28 PROCEDURE — C9803 HOPD COVID-19 SPEC COLLECT: HCPCS

## 2021-03-28 PROCEDURE — HZ2ZZZZ DETOXIFICATION SERVICES FOR SUBSTANCE ABUSE TREATMENT: ICD-10-PCS | Performed by: ALLERGY & IMMUNOLOGY

## 2021-03-28 RX ADMIN — HYDROXYZINE PAMOATE SCH: 25 CAPSULE ORAL at 18:28

## 2021-03-28 RX ADMIN — Medication SCH: at 23:01

## 2021-03-28 RX ADMIN — Medication SCH: at 23:00

## 2021-03-28 RX ADMIN — HYDROXYZINE PAMOATE SCH: 25 CAPSULE ORAL at 23:00

## 2021-03-29 LAB
ALBUMIN SERPL-MCNC: 3.2 G/DL (ref 3.4–5)
ALP SERPL-CCNC: 61 U/L (ref 45–117)
ALT SERPL-CCNC: 16 U/L (ref 13–61)
ANION GAP SERPL CALC-SCNC: 3 MMOL/L (ref 8–16)
AST SERPL-CCNC: 12 U/L (ref 15–37)
BILIRUB SERPL-MCNC: 0.5 MG/DL (ref 0.2–1)
BUN SERPL-MCNC: 12.3 MG/DL (ref 7–18)
CALCIUM SERPL-MCNC: 9.1 MG/DL (ref 8.5–10.1)
CHLORIDE SERPL-SCNC: 108 MMOL/L (ref 98–107)
CO2 SERPL-SCNC: 31 MMOL/L (ref 21–32)
CREAT SERPL-MCNC: 1 MG/DL (ref 0.55–1.3)
DEPRECATED RDW RBC AUTO: 14.6 % (ref 11.6–15.6)
GLUCOSE SERPL-MCNC: 88 MG/DL (ref 74–106)
HCT VFR BLD CALC: 41 % (ref 32.4–45.2)
HGB BLD-MCNC: 13.9 GM/DL (ref 10.7–15.3)
MCH RBC QN AUTO: 31.5 PG (ref 25.7–33.7)
MCHC RBC AUTO-ENTMCNC: 33.9 G/DL (ref 32–36)
MCV RBC: 92.8 FL (ref 80–96)
PLATELET # BLD AUTO: 341 K/MM3 (ref 134–434)
PMV BLD: 8.4 FL (ref 7.5–11.1)
POTASSIUM SERPLBLD-SCNC: 4.4 MMOL/L (ref 3.5–5.1)
PROT SERPL-MCNC: 6.8 G/DL (ref 6.4–8.2)
RBC # BLD AUTO: 4.41 M/MM3 (ref 3.6–5.2)
SODIUM SERPL-SCNC: 142 MMOL/L (ref 136–145)
WBC # BLD AUTO: 5.4 K/MM3 (ref 4–10)

## 2021-03-29 RX ADMIN — Medication SCH TAB: at 10:37

## 2021-03-29 RX ADMIN — Medication SCH MG: at 22:36

## 2021-03-29 RX ADMIN — HYDROXYZINE PAMOATE SCH: 25 CAPSULE ORAL at 07:39

## 2021-03-29 RX ADMIN — HYDROXYZINE PAMOATE SCH: 25 CAPSULE ORAL at 11:24

## 2021-03-30 VITALS — HEART RATE: 109 BPM | TEMPERATURE: 98.4 F | SYSTOLIC BLOOD PRESSURE: 126 MMHG | DIASTOLIC BLOOD PRESSURE: 71 MMHG

## 2021-03-30 RX ADMIN — Medication SCH TAB: at 11:19

## 2021-05-19 ENCOUNTER — HOSPITAL ENCOUNTER (INPATIENT)
Dept: HOSPITAL 74 - YASAS | Age: 31
LOS: 4 days | Discharge: LEFT BEFORE DISCHARGE | DRG: 770 | End: 2021-05-23
Attending: ALLERGY & IMMUNOLOGY | Admitting: ALLERGY & IMMUNOLOGY
Payer: COMMERCIAL

## 2021-05-19 VITALS — BODY MASS INDEX: 28.3 KG/M2

## 2021-05-19 DIAGNOSIS — Z59.0: ICD-10-CM

## 2021-05-19 DIAGNOSIS — F19.20: ICD-10-CM

## 2021-05-19 DIAGNOSIS — F14.20: ICD-10-CM

## 2021-05-19 DIAGNOSIS — Z86.19: ICD-10-CM

## 2021-05-19 DIAGNOSIS — Z91.14: ICD-10-CM

## 2021-05-19 DIAGNOSIS — F10.20: Primary | ICD-10-CM

## 2021-05-19 DIAGNOSIS — F19.24: ICD-10-CM

## 2021-05-19 DIAGNOSIS — F19.282: ICD-10-CM

## 2021-05-19 DIAGNOSIS — F25.9: ICD-10-CM

## 2021-05-19 DIAGNOSIS — F17.210: ICD-10-CM

## 2021-05-19 DIAGNOSIS — F31.9: ICD-10-CM

## 2021-05-19 DIAGNOSIS — Z62.810: ICD-10-CM

## 2021-05-19 DIAGNOSIS — J45.909: ICD-10-CM

## 2021-05-19 PROCEDURE — HZ42ZZZ GROUP COUNSELING FOR SUBSTANCE ABUSE TREATMENT, COGNITIVE-BEHAVIORAL: ICD-10-PCS | Performed by: ALLERGY & IMMUNOLOGY

## 2021-05-19 PROCEDURE — U0003 INFECTIOUS AGENT DETECTION BY NUCLEIC ACID (DNA OR RNA); SEVERE ACUTE RESPIRATORY SYNDROME CORONAVIRUS 2 (SARS-COV-2) (CORONAVIRUS DISEASE [COVID-19]), AMPLIFIED PROBE TECHNIQUE, MAKING USE OF HIGH THROUGHPUT TECHNOLOGIES AS DESCRIBED BY CMS-2020-01-R: HCPCS

## 2021-05-19 PROCEDURE — C9803 HOPD COVID-19 SPEC COLLECT: HCPCS

## 2021-05-19 PROCEDURE — U0005 INFEC AGEN DETEC AMPLI PROBE: HCPCS

## 2021-05-19 SDOH — ECONOMIC STABILITY - HOUSING INSECURITY: HOMELESSNESS: Z59.0

## 2021-05-20 LAB
ALBUMIN SERPL-MCNC: 3.3 G/DL (ref 3.4–5)
ALP SERPL-CCNC: 70 U/L (ref 45–117)
ALT SERPL-CCNC: 17 U/L (ref 13–61)
ANION GAP SERPL CALC-SCNC: 3 MMOL/L (ref 8–16)
AST SERPL-CCNC: 11 U/L (ref 15–37)
BILIRUB SERPL-MCNC: 0.5 MG/DL (ref 0.2–1)
BUN SERPL-MCNC: 9.7 MG/DL (ref 7–18)
CALCIUM SERPL-MCNC: 8.2 MG/DL (ref 8.5–10.1)
CHLORIDE SERPL-SCNC: 108 MMOL/L (ref 98–107)
CO2 SERPL-SCNC: 28 MMOL/L (ref 21–32)
CREAT SERPL-MCNC: 0.8 MG/DL (ref 0.55–1.3)
DEPRECATED RDW RBC AUTO: 14.6 % (ref 11.6–15.6)
GLUCOSE SERPL-MCNC: 85 MG/DL (ref 74–106)
HCT VFR BLD CALC: 39.8 % (ref 32.4–45.2)
HGB BLD-MCNC: 13.9 GM/DL (ref 10.7–15.3)
MCH RBC QN AUTO: 32.3 PG (ref 25.7–33.7)
MCHC RBC AUTO-ENTMCNC: 34.9 G/DL (ref 32–36)
MCV RBC: 92.4 FL (ref 80–96)
PLATELET # BLD AUTO: 266 K/MM3 (ref 134–434)
PMV BLD: 8.5 FL (ref 7.5–11.1)
PROT SERPL-MCNC: 6.6 G/DL (ref 6.4–8.2)
RBC # BLD AUTO: 4.31 M/MM3 (ref 3.6–5.2)
SODIUM SERPL-SCNC: 138 MMOL/L (ref 136–145)
WBC # BLD AUTO: 5 K/MM3 (ref 4–10)

## 2021-05-20 RX ADMIN — Medication SCH MG: at 01:22

## 2021-05-20 RX ADMIN — HYDROXYZINE PAMOATE PRN MG: 25 CAPSULE ORAL at 21:29

## 2021-05-20 RX ADMIN — Medication SCH MG: at 21:29

## 2021-05-20 RX ADMIN — NICOTINE SCH: 14 PATCH, EXTENDED RELEASE TRANSDERMAL at 10:30

## 2021-05-20 RX ADMIN — QUETIAPINE FUMARATE SCH MG: 100 TABLET ORAL at 21:29

## 2021-05-20 RX ADMIN — Medication SCH: at 10:30

## 2021-05-21 VITALS — DIASTOLIC BLOOD PRESSURE: 78 MMHG | SYSTOLIC BLOOD PRESSURE: 124 MMHG | HEART RATE: 93 BPM | TEMPERATURE: 97.1 F

## 2021-05-21 RX ADMIN — NICOTINE SCH MG: 14 PATCH, EXTENDED RELEASE TRANSDERMAL at 10:21

## 2021-05-21 RX ADMIN — HYDROXYZINE PAMOATE PRN MG: 25 CAPSULE ORAL at 22:05

## 2021-05-21 RX ADMIN — HYDROXYZINE PAMOATE PRN MG: 25 CAPSULE ORAL at 10:20

## 2021-05-21 RX ADMIN — Medication SCH MG: at 22:05

## 2021-05-21 RX ADMIN — QUETIAPINE FUMARATE SCH MG: 100 TABLET ORAL at 22:05

## 2021-05-21 RX ADMIN — Medication SCH TAB: at 10:20

## 2021-05-22 RX ADMIN — Medication SCH MG: at 21:43

## 2021-05-22 RX ADMIN — Medication SCH MG: at 21:42

## 2021-05-22 RX ADMIN — NICOTINE SCH: 14 PATCH, EXTENDED RELEASE TRANSDERMAL at 11:22

## 2021-05-22 RX ADMIN — Medication SCH TAB: at 10:19

## 2021-05-22 RX ADMIN — QUETIAPINE FUMARATE SCH MG: 100 TABLET ORAL at 21:43

## 2021-05-23 RX ADMIN — NICOTINE SCH: 14 PATCH, EXTENDED RELEASE TRANSDERMAL at 13:53

## 2021-05-23 RX ADMIN — Medication SCH: at 13:53

## 2022-01-05 ENCOUNTER — HOSPITAL ENCOUNTER (INPATIENT)
Dept: HOSPITAL 74 - YASAS | Age: 32
LOS: 1 days | Discharge: LEFT BEFORE DISCHARGE | DRG: 770 | End: 2022-01-06
Attending: ALLERGY & IMMUNOLOGY | Admitting: ALLERGY & IMMUNOLOGY
Payer: COMMERCIAL

## 2022-01-05 VITALS — DIASTOLIC BLOOD PRESSURE: 93 MMHG | TEMPERATURE: 98.2 F | SYSTOLIC BLOOD PRESSURE: 149 MMHG | HEART RATE: 85 BPM

## 2022-01-05 VITALS — BODY MASS INDEX: 32.3 KG/M2

## 2022-01-05 DIAGNOSIS — F20.9: ICD-10-CM

## 2022-01-05 DIAGNOSIS — F17.210: ICD-10-CM

## 2022-01-05 DIAGNOSIS — F12.20: ICD-10-CM

## 2022-01-05 DIAGNOSIS — F10.20: ICD-10-CM

## 2022-01-05 DIAGNOSIS — F14.20: ICD-10-CM

## 2022-01-05 DIAGNOSIS — Z59.01: ICD-10-CM

## 2022-01-05 DIAGNOSIS — F31.9: ICD-10-CM

## 2022-01-05 DIAGNOSIS — F19.259: ICD-10-CM

## 2022-01-05 DIAGNOSIS — F19.24: ICD-10-CM

## 2022-01-05 DIAGNOSIS — F11.20: Primary | ICD-10-CM

## 2022-01-05 PROCEDURE — HZ42ZZZ GROUP COUNSELING FOR SUBSTANCE ABUSE TREATMENT, COGNITIVE-BEHAVIORAL: ICD-10-PCS | Performed by: ALLERGY & IMMUNOLOGY

## 2022-01-05 PROCEDURE — G0008 ADMIN INFLUENZA VIRUS VAC: HCPCS

## 2022-01-05 PROCEDURE — U0005 INFEC AGEN DETEC AMPLI PROBE: HCPCS

## 2022-01-05 PROCEDURE — U0003 INFECTIOUS AGENT DETECTION BY NUCLEIC ACID (DNA OR RNA); SEVERE ACUTE RESPIRATORY SYNDROME CORONAVIRUS 2 (SARS-COV-2) (CORONAVIRUS DISEASE [COVID-19]), AMPLIFIED PROBE TECHNIQUE, MAKING USE OF HIGH THROUGHPUT TECHNOLOGIES AS DESCRIBED BY CMS-2020-01-R: HCPCS

## 2022-01-05 PROCEDURE — C9803 HOPD COVID-19 SPEC COLLECT: HCPCS

## 2022-01-05 RX ADMIN — HYDROXYZINE PAMOATE SCH: 25 CAPSULE ORAL at 22:58

## 2022-01-05 RX ADMIN — HYDROXYZINE PAMOATE SCH: 25 CAPSULE ORAL at 22:57

## 2022-01-05 SDOH — ECONOMIC STABILITY - HOUSING INSECURITY: SHELTERED HOMELESSNESS: Z59.01

## 2022-01-06 LAB
ALBUMIN SERPL-MCNC: 3 G/DL (ref 3.4–5)
ALP SERPL-CCNC: 50 U/L (ref 45–117)
ALT SERPL-CCNC: 17 U/L (ref 13–61)
ANION GAP SERPL CALC-SCNC: 5 MMOL/L (ref 8–16)
AST SERPL-CCNC: 14 U/L (ref 15–37)
BILIRUB SERPL-MCNC: 0.5 MG/DL (ref 0.2–1)
BUN SERPL-MCNC: 11.2 MG/DL (ref 7–18)
CALCIUM SERPL-MCNC: 8.4 MG/DL (ref 8.5–10.1)
CHLORIDE SERPL-SCNC: 110 MMOL/L (ref 98–107)
CO2 SERPL-SCNC: 25 MMOL/L (ref 21–32)
CREAT SERPL-MCNC: 0.8 MG/DL (ref 0.55–1.3)
DEPRECATED RDW RBC AUTO: 14.6 % (ref 11.6–15.6)
GLUCOSE SERPL-MCNC: 78 MG/DL (ref 74–106)
HCT VFR BLD CALC: 35.5 % (ref 32.4–45.2)
HGB BLD-MCNC: 11.7 GM/DL (ref 10.7–15.3)
MCH RBC QN AUTO: 30.9 PG (ref 25.7–33.7)
MCHC RBC AUTO-ENTMCNC: 33.1 G/DL (ref 32–36)
MCV RBC: 93.4 FL (ref 80–96)
PLATELET # BLD AUTO: 272 10^3/UL (ref 134–434)
PMV BLD: 8.8 FL (ref 7.5–11.1)
PROT SERPL-MCNC: 6 G/DL (ref 6.4–8.2)
RBC # BLD AUTO: 3.8 M/MM3 (ref 3.6–5.2)
SODIUM SERPL-SCNC: 140 MMOL/L (ref 136–145)
WBC # BLD AUTO: 5.4 K/MM3 (ref 4–10)

## 2022-01-06 RX ADMIN — HYDROXYZINE PAMOATE SCH: 25 CAPSULE ORAL at 20:29

## 2022-01-06 RX ADMIN — HYDROXYZINE PAMOATE SCH: 25 CAPSULE ORAL at 05:41

## 2022-01-06 RX ADMIN — HYDROXYZINE PAMOATE SCH MG: 25 CAPSULE ORAL at 10:25

## 2022-01-06 RX ADMIN — HYDROXYZINE PAMOATE SCH: 25 CAPSULE ORAL at 16:26

## 2022-08-23 ENCOUNTER — HOSPITAL ENCOUNTER (INPATIENT)
Dept: HOSPITAL 74 - YASAS | Age: 32
LOS: 4 days | Discharge: HOME | End: 2022-08-27
Attending: SURGERY | Admitting: ALLERGY & IMMUNOLOGY
Payer: COMMERCIAL

## 2022-08-23 VITALS — BODY MASS INDEX: 32.1 KG/M2

## 2022-08-23 DIAGNOSIS — F19.24: ICD-10-CM

## 2022-08-23 DIAGNOSIS — F20.9: ICD-10-CM

## 2022-08-23 DIAGNOSIS — F14.20: ICD-10-CM

## 2022-08-23 DIAGNOSIS — F12.20: ICD-10-CM

## 2022-08-23 DIAGNOSIS — Z86.19: ICD-10-CM

## 2022-08-23 DIAGNOSIS — F10.230: Primary | ICD-10-CM

## 2022-08-23 DIAGNOSIS — U07.1: ICD-10-CM

## 2022-08-23 DIAGNOSIS — F17.210: ICD-10-CM

## 2022-08-23 DIAGNOSIS — Z91.14: ICD-10-CM

## 2022-08-23 DIAGNOSIS — E66.9: ICD-10-CM

## 2022-08-23 PROCEDURE — U0003 INFECTIOUS AGENT DETECTION BY NUCLEIC ACID (DNA OR RNA); SEVERE ACUTE RESPIRATORY SYNDROME CORONAVIRUS 2 (SARS-COV-2) (CORONAVIRUS DISEASE [COVID-19]), AMPLIFIED PROBE TECHNIQUE, MAKING USE OF HIGH THROUGHPUT TECHNOLOGIES AS DESCRIBED BY CMS-2020-01-R: HCPCS

## 2022-08-23 PROCEDURE — HZ2ZZZZ DETOXIFICATION SERVICES FOR SUBSTANCE ABUSE TREATMENT: ICD-10-PCS | Performed by: SURGERY

## 2022-08-23 PROCEDURE — U0005 INFEC AGEN DETEC AMPLI PROBE: HCPCS

## 2022-08-23 RX ADMIN — Medication SCH: at 23:25

## 2022-08-23 RX ADMIN — NICOTINE SCH: 21 PATCH TRANSDERMAL at 17:41

## 2022-08-23 RX ADMIN — HYDROXYZINE PAMOATE SCH: 25 CAPSULE ORAL at 23:25

## 2022-08-23 RX ADMIN — Medication SCH: at 17:41

## 2022-08-23 RX ADMIN — HYDROXYZINE PAMOATE SCH MG: 25 CAPSULE ORAL at 17:37

## 2022-08-24 LAB
ALBUMIN SERPL-MCNC: 3.3 G/DL (ref 3.4–5)
ALP SERPL-CCNC: 60 U/L (ref 45–117)
ALT SERPL-CCNC: 20 U/L (ref 13–61)
ANION GAP SERPL CALC-SCNC: 6 MMOL/L (ref 8–16)
AST SERPL-CCNC: 15 U/L (ref 15–37)
BILIRUB SERPL-MCNC: 0.6 MG/DL (ref 0.2–1)
BUN SERPL-MCNC: 15 MG/DL (ref 7–18)
CALCIUM SERPL-MCNC: 8.8 MG/DL (ref 8.5–10.1)
CHLORIDE SERPL-SCNC: 108 MMOL/L (ref 98–107)
CO2 SERPL-SCNC: 28 MMOL/L (ref 21–32)
CREAT SERPL-MCNC: 0.8 MG/DL (ref 0.55–1.3)
DEPRECATED RDW RBC AUTO: 14.5 % (ref 11.6–15.6)
GLUCOSE SERPL-MCNC: 92 MG/DL (ref 74–106)
HCT VFR BLD CALC: 39.2 % (ref 32.4–45.2)
HGB BLD-MCNC: 13.4 GM/DL (ref 10.7–15.3)
MCH RBC QN AUTO: 31.5 PG (ref 25.7–33.7)
MCHC RBC AUTO-ENTMCNC: 34.2 G/DL (ref 32–36)
MCV RBC: 92.2 FL (ref 80–96)
PLATELET # BLD AUTO: 321 10^3/UL (ref 134–434)
PMV BLD: 8.3 FL (ref 7.5–11.1)
PROT SERPL-MCNC: 6.6 G/DL (ref 6.4–8.2)
RBC # BLD AUTO: 4.26 M/MM3 (ref 3.6–5.2)
SODIUM SERPL-SCNC: 142 MMOL/L (ref 136–145)
WBC # BLD AUTO: 5.3 K/MM3 (ref 4–10)

## 2022-08-24 RX ADMIN — HYDROXYZINE PAMOATE SCH MG: 25 CAPSULE ORAL at 22:42

## 2022-08-24 RX ADMIN — HYDROXYZINE PAMOATE SCH: 25 CAPSULE ORAL at 18:56

## 2022-08-24 RX ADMIN — HYDROXYZINE PAMOATE SCH MG: 25 CAPSULE ORAL at 10:28

## 2022-08-24 RX ADMIN — HYDROXYZINE PAMOATE SCH: 25 CAPSULE ORAL at 14:10

## 2022-08-24 RX ADMIN — HYDROXYZINE PAMOATE SCH: 25 CAPSULE ORAL at 07:56

## 2022-08-24 RX ADMIN — NICOTINE SCH: 21 PATCH TRANSDERMAL at 11:16

## 2022-08-24 RX ADMIN — Medication SCH MG: at 22:42

## 2022-08-24 RX ADMIN — Medication SCH TAB: at 10:28

## 2022-08-25 RX ADMIN — NICOTINE SCH: 21 PATCH TRANSDERMAL at 11:50

## 2022-08-25 RX ADMIN — HYDROXYZINE PAMOATE SCH: 25 CAPSULE ORAL at 13:34

## 2022-08-25 RX ADMIN — HYDROXYZINE PAMOATE SCH MG: 25 CAPSULE ORAL at 22:55

## 2022-08-25 RX ADMIN — HYDROXYZINE PAMOATE SCH: 25 CAPSULE ORAL at 07:20

## 2022-08-25 RX ADMIN — HYDROXYZINE PAMOATE SCH: 25 CAPSULE ORAL at 18:49

## 2022-08-25 RX ADMIN — Medication SCH MG: at 22:55

## 2022-08-25 RX ADMIN — HYDROXYZINE PAMOATE SCH: 25 CAPSULE ORAL at 11:50

## 2022-08-25 RX ADMIN — Medication SCH: at 11:50

## 2022-08-25 RX ADMIN — Medication SCH MG: at 23:00

## 2022-08-26 VITALS — RESPIRATION RATE: 18 BRPM

## 2022-08-26 RX ADMIN — Medication SCH: at 22:59

## 2022-08-26 RX ADMIN — NICOTINE SCH: 21 PATCH TRANSDERMAL at 11:09

## 2022-08-26 RX ADMIN — Medication SCH TAB: at 11:09

## 2022-08-26 RX ADMIN — HYDROXYZINE PAMOATE SCH: 25 CAPSULE ORAL at 18:36

## 2022-08-26 RX ADMIN — HYDROXYZINE PAMOATE SCH MG: 25 CAPSULE ORAL at 06:46

## 2022-08-26 RX ADMIN — HYDROXYZINE PAMOATE SCH: 25 CAPSULE ORAL at 22:59

## 2022-08-26 RX ADMIN — HYDROXYZINE PAMOATE SCH MG: 25 CAPSULE ORAL at 11:09

## 2022-08-26 RX ADMIN — HYDROXYZINE PAMOATE SCH: 25 CAPSULE ORAL at 14:45

## 2022-08-27 VITALS — HEART RATE: 90 BPM | DIASTOLIC BLOOD PRESSURE: 79 MMHG | SYSTOLIC BLOOD PRESSURE: 136 MMHG | TEMPERATURE: 98.9 F

## 2022-08-27 RX ADMIN — HYDROXYZINE PAMOATE SCH MG: 25 CAPSULE ORAL at 11:10

## 2022-08-27 RX ADMIN — NICOTINE SCH: 21 PATCH TRANSDERMAL at 11:09

## 2022-08-27 RX ADMIN — HYDROXYZINE PAMOATE SCH MG: 25 CAPSULE ORAL at 06:18

## 2022-08-27 RX ADMIN — Medication SCH TAB: at 11:09

## 2023-03-27 ENCOUNTER — EMERGENCY (EMERGENCY)
Facility: HOSPITAL | Age: 33
LOS: 1 days | Discharge: HOME | End: 2023-03-27
Admitting: EMERGENCY MEDICINE
Payer: MEDICAID

## 2023-03-27 VITALS
SYSTOLIC BLOOD PRESSURE: 125 MMHG | RESPIRATION RATE: 18 BRPM | HEART RATE: 97 BPM | DIASTOLIC BLOOD PRESSURE: 72 MMHG | WEIGHT: 160.06 LBS | TEMPERATURE: 97 F | OXYGEN SATURATION: 93 %

## 2023-03-27 DIAGNOSIS — F14.10 COCAINE ABUSE, UNCOMPLICATED: ICD-10-CM

## 2023-03-27 DIAGNOSIS — F10.10 ALCOHOL ABUSE, UNCOMPLICATED: ICD-10-CM

## 2023-03-27 PROCEDURE — 99284 EMERGENCY DEPT VISIT MOD MDM: CPT

## 2023-03-27 NOTE — ED ADULT TRIAGE NOTE - MODE OF ARRIVAL
Trace nuclear sclerosis of lens of both eyes.  Minimal retinal pigment epithelium changes at macula of left eye (only).  Monitor only.  Otherwise, good ocular health in both eyes.  No evidence of diabetic retinal changes in either eye.  Myopia in each eye, with very satisfactory correctable VA in each eye.  Presbyopia consistent with age.  New spectacle lens Rx issued for use as desired.  Recheck in one year - or prior if notes any problems or apparent changes in vision in the interim.    [facility]